# Patient Record
Sex: FEMALE | Race: WHITE | NOT HISPANIC OR LATINO | Employment: FULL TIME | URBAN - METROPOLITAN AREA
[De-identification: names, ages, dates, MRNs, and addresses within clinical notes are randomized per-mention and may not be internally consistent; named-entity substitution may affect disease eponyms.]

---

## 2019-07-15 ENCOUNTER — HOSPITAL ENCOUNTER (EMERGENCY)
Facility: OTHER | Age: 34
Discharge: HOME | End: 2019-07-16
Attending: EMERGENCY MEDICINE

## 2019-07-15 DIAGNOSIS — R10.2 PELVIC PAIN IN FEMALE: Primary | ICD-10-CM

## 2019-07-15 PROCEDURE — 99283 EMERGENCY DEPT VISIT LOW MDM: CPT

## 2019-07-15 RX ORDER — SULFAMETHOXAZOLE AND TRIMETHOPRIM 800; 160 MG/1; MG/1
1 TABLET ORAL
COMMUNITY

## 2019-07-16 VITALS
HEIGHT: 70 IN | RESPIRATION RATE: 15 BRPM | BODY MASS INDEX: 20.04 KG/M2 | SYSTOLIC BLOOD PRESSURE: 109 MMHG | TEMPERATURE: 99 F | OXYGEN SATURATION: 100 % | DIASTOLIC BLOOD PRESSURE: 57 MMHG | HEART RATE: 78 BPM | WEIGHT: 140 LBS

## 2019-07-16 LAB
B-HCG UR QL: NEGATIVE
BILIRUB UR QL STRIP: NEGATIVE
C TRACH DNA SPEC QL NAA+PROBE: NOT DETECTED
CLARITY UR: CLEAR
COLOR UR: YELLOW
CTP QC/QA: YES
GLUCOSE UR QL STRIP: NEGATIVE
HGB UR QL STRIP: NEGATIVE
KETONES UR QL STRIP: NEGATIVE
LEUKOCYTE ESTERASE UR QL STRIP: NEGATIVE
N GONORRHOEA DNA SPEC QL NAA+PROBE: NOT DETECTED
NITRITE UR QL STRIP: NEGATIVE
PH UR STRIP: 8 [PH] (ref 5–8)
PROT UR QL STRIP: NEGATIVE
SP GR UR STRIP: 1.01 (ref 1–1.03)
URN SPEC COLLECT METH UR: NORMAL
UROBILINOGEN UR STRIP-ACNC: NEGATIVE EU/DL

## 2019-07-16 PROCEDURE — 81003 URINALYSIS AUTO W/O SCOPE: CPT

## 2019-07-16 PROCEDURE — 87491 CHLMYD TRACH DNA AMP PROBE: CPT

## 2019-07-16 PROCEDURE — 81025 URINE PREGNANCY TEST: CPT | Performed by: EMERGENCY MEDICINE

## 2019-07-16 NOTE — DISCHARGE INSTRUCTIONS
Call your primary care doctor to make the first available appointment.     Keep all your medical appointments.     Take your regular medication as prescribed. Contact your primary care provider before running out of medication, or for any problems obtaining them.    Do not drive or operate heavy machinery while taking opioid or muscle relaxing medications. Examples include norco, percocet, xanax, valium, flexeril.     Overuse or long term use of pain and sedating medication may lead to addiction, dependence, organ failure, family and work problems, legal problems, accidental overdose and death.    If you do not have health insurance, you probably qualify for heavily discounted rates:  Call 1-132.266.2970 ([facility] hotline) or go to www.[place].gov    Your evaluation in the ED does not suggest any emergent or life threatening medical condition requiring admission or immediate intervention beyond that provided in the ED.   However, the signs of a serious problem sometimes take more time to appear.   RETURN TO THE ER if any of the following occur:    Weakness, dizziness, fainting, or loss of consciousness   Fever of 100.4ºF (38ºC) or higher  Any worse symptoms  Any new or concerning symptoms      We tested you for gonorrhea and chlamydia. The result does not come back for 2-3 days.   Avoid sexual activity for at least 2 weeks and both you and your partner/s have finished taking all of the antibiotic medicine.  Learn about safe sex practices and use these in the future. The safest sex is with a partner who has tested negative and only has sex with you. Condoms offer protection from unwanted pregnancy and spreading some sexually transmitted diseases (Gonorrhea, Chlamydia, syphilis, and HIV). They are not a guarantee.

## 2019-07-16 NOTE — ED PROVIDER NOTES
"Encounter Date: 7/15/2019    SCRIBE #1 NOTE: I, Wale Enriqueial, am scribing for, and in the presence of, Dr. Ramos.       History     Chief Complaint   Patient presents with    Abdominal Pain     with abd distension x "a few hours."  Reports pelvic pressure and a hx of UTIs.  feels "pressure when she urinates."  reports malodorus urine. +n/-v/-d.  Denies fever.  Is currently taking Bactrim.    Pelvic Pain     with white to yellow vaginal discharge     Time seen by provider: 12:23 AM    This is a 34 y.o. female who presents with complaint of lower pelvic intermittent "cramping" character pain that began approximately one week ago. She was diagnosed with recurrent UTI's and is on Bactrim for her last round. This was not a problem until March and has three or four since then. She states that she was  from her  at around the same time the UTI's began, and suspects that he was unfaithful. She reports that she is also experiencing mild vaginal discharge and abdominal distension. She denies fever, sore throat, chest pain, shortness of breath, nausea, and back pain. She denies any history of STD's.     The history is provided by the patient and medical records.     Review of patient's allergies indicates:  No Known Allergies  History reviewed. No pertinent past medical history.  History reviewed. No pertinent surgical history.  History reviewed. No pertinent family history.  Social History     Tobacco Use    Smoking status: Never Smoker    Smokeless tobacco: Never Used   Substance Use Topics    Alcohol use: Never     Frequency: Never    Drug use: Never     ROS: As per HPI and below:   General: No fever. No chills. No generalized weakness.   HENT: No sore throat. No rhinorrhea. No facial pain. No facial swelling.   Eyes: No visual changes. No eye pain.   Cardiovascular: No chest pain.   Respiratory: No dyspnea. No cough.   GI: Positive for abdominal distension. No abdominal pain. No nausea. No vomiting. " "No diarrhea.   : Positive for vaginal discharge. Positive for pelvic pain.   Skin: No rashes.  Neuro: No focal weakness. No headache. No syncope.  Musculoskeletal: No extremity pain. No swelling.    Psych: No acute changes.  All other systems negative.     Physical Exam     Initial Vitals [07/15/19 2251]   BP Pulse Resp Temp SpO2   (Abnormal) 110/56 66 14 98.5 °F (36.9 °C) 98 %      MAP       --         Nursing note and vitals reviewed.  Blood Pressure (Abnormal) 110/56 (BP Location: Right arm, Patient Position: Sitting)   Pulse 66   Temperature 98.5 °F (36.9 °C) (Oral)   Respiration 14   Height 5' 10" (1.778 m)   Weight 63.5 kg (140 lb)   Oxygen Saturation 98%   Body Mass Index 20.09 kg/m²   Constitutional: AAOx3. No distress.  Eyes: EOMI. No discharge. Anicteric.  HENT:   Mouth/Throat: Oropharynx is clear and moist. Uvula midline.    Neck: Normal range of motion. Neck supple.  Cardiovascular: Normal rate. No murmur, no gallop and no friction rub heard.   Pulmonary/Chest: No respiratory distress. Effort normal. No wheezes, no rales, no rhonchi  Abdominal: Bowel sounds normal. Soft. No distension and no mass. There is no tenderness. There is no rebound, no guarding, no tenderness at McBurney's point and negative Ruiz's sign.   Musculoskeletal: Normal range of motion. No CVA tenderness. No midline spinal tenderness.  Neurological: GCS 15. Alert and oriented to person, place, and time. No gross cranial nerve, light touch or strength deficit. Coordination normal.   Skin: Skin is warm and dry.   Psychiatric: Behavior is normal. Judgment normal.      ED Course   Procedures  Labs Reviewed   C. TRACHOMATIS/N. GONORRHOEAE BY AMP DNA   URINALYSIS, REFLEX TO URINE CULTURE    Narrative:     Preferred Collection Type->Urine, Clean Catch   POCT URINE PREGNANCY          Imaging Results    None          Medical Decision Making:   Clinical Tests:   Lab Tests: Ordered and Reviewed            Scribe Attestation:   Scribe " #1: I performed the above scribed service and the documentation accurately describes the services I performed. I attest to the accuracy of the note.    Attending Attestation:           Physician Attestation for Scribe:  Physician Attestation Statement for Scribe #1: I, Dr. Ramos, reviewed documentation, as scribed by Wale Felton  in my presence, and it is both accurate and complete.                 ED Course as of Jul 16 0547   Tue Jul 16, 2019   0032 Patient is a 34-year-old female with reported past medical history apart from recent recurrent there tract infections treated with Bactrim who presents with several days low pelvic pain radiating to her back, and some recent vaginal discharge.     [RC]   0543 I independently reviewed and interpreted labs which are unremarkable / unrevealing including urinalysis not consistent with acute UTI.  Shashi and Chlamydia infection is also on the differential but appears unlikely.  I doubt tubo-ovarian abscess, ovarian torsion.  The patient initially deferred pelvic exam. On reassessment I discussed her UA result. The pt again declined pelvic exam against medical advice. The patient displays normal decision making capacity. I explained that the risks of declining at this time include missed/delayed diagnosis, worsening of condition, bleeding, infection, permanent disability, MI, coma, and death. Furthermore, I offered the patient alternatives to the preferred evaluation and treatment plan, specifically keeping her already scheduled follow-up with her OBGYN on 7/17.  I offered the patient an.  Treatment for suspected gonorrhea/chlamydia versus waiting for her results.  She states that the phone number listed on her demographics in the chart is accurate.  The risks and benefits of these alternatives were discussed. All the patient's questions were answered.  Nonetheless, the patient persists in refusing pelvic exam and elects to defer empiric gonorrhea/chlamydia treatment at  this time.The patient was encouraged to return at any point for continued, new, or concerning symptoms.  .       [RC]      ED Course User Index  [RC] Nomi Ramos MD     Clinical Impression:     1. Pelvic pain in female                                   Nomi Ramos MD  07/16/19 8084